# Patient Record
Sex: MALE | Race: WHITE | Employment: OTHER | URBAN - NONMETROPOLITAN AREA
[De-identification: names, ages, dates, MRNs, and addresses within clinical notes are randomized per-mention and may not be internally consistent; named-entity substitution may affect disease eponyms.]

---

## 2021-05-25 ENCOUNTER — OFFICE VISIT (OUTPATIENT)
Dept: CARDIOLOGY CLINIC | Age: 81
End: 2021-05-25
Payer: MEDICARE

## 2021-05-25 VITALS
DIASTOLIC BLOOD PRESSURE: 62 MMHG | WEIGHT: 205 LBS | RESPIRATION RATE: 20 BRPM | HEIGHT: 66 IN | OXYGEN SATURATION: 94 % | HEART RATE: 86 BPM | SYSTOLIC BLOOD PRESSURE: 108 MMHG | BODY MASS INDEX: 32.95 KG/M2

## 2021-05-25 DIAGNOSIS — R06.02 SHORTNESS OF BREATH: ICD-10-CM

## 2021-05-25 DIAGNOSIS — R01.1 CARDIAC MURMUR: ICD-10-CM

## 2021-05-25 DIAGNOSIS — R07.9 CHEST PAIN, UNSPECIFIED TYPE: Primary | ICD-10-CM

## 2021-05-25 PROCEDURE — 93000 ELECTROCARDIOGRAM COMPLETE: CPT | Performed by: INTERNAL MEDICINE

## 2021-05-25 PROCEDURE — 99204 OFFICE O/P NEW MOD 45 MIN: CPT | Performed by: INTERNAL MEDICINE

## 2021-05-25 RX ORDER — LORATADINE 10 MG/1
10 TABLET ORAL DAILY
COMMUNITY

## 2021-05-25 RX ORDER — HYDROCHLOROTHIAZIDE 50 MG/1
TABLET ORAL
COMMUNITY
Start: 2021-05-20 | End: 2021-07-27

## 2021-05-25 RX ORDER — METOPROLOL TARTRATE 50 MG/1
50 TABLET, FILM COATED ORAL SEE ADMIN INSTRUCTIONS
Qty: 2 TABLET | Refills: 0 | Status: SHIPPED | OUTPATIENT
Start: 2021-05-25 | End: 2021-07-27

## 2021-05-25 RX ORDER — ALLOPURINOL 100 MG/1
100 TABLET ORAL DAILY
COMMUNITY

## 2021-05-25 RX ORDER — AMLODIPINE BESYLATE 2.5 MG/1
2.5 TABLET ORAL DAILY
COMMUNITY
End: 2021-07-27

## 2021-05-25 RX ORDER — VITAMIN E 268 MG
400 CAPSULE ORAL DAILY
COMMUNITY

## 2021-05-25 NOTE — PROGRESS NOTES
[address]  5/25/21  Referring: Dr. Luis Allen CONSULT/CHIEF COMPLAINT/HPI     Reason for visit/ Chief complaint  New patient  Shortness  Of breath    HPI Canelo Narayan is a [de-identified] y.o. seen in consult with Dr Luis Barbosa for shortness of breath, and chest pain. He has a history of hypertension, copd, chf, morbid obesity. Recently treated with keflex for cellulitis left knee    He is unsure if he had covid-19, but he has been vaccinated. Smoked from age [age]'s. He has started and quit multiple times, has not smoked for several years. He drinks a couple beers every week or two    He has worsening exertional shortness of breath when he is walking due to shortness of breath, chest pain, palpitations, alleviated with rest. He states that the discomfort is sharp and lasts less than 30 seconds. It is all new over the last month    He has a lot of sinus drainage,and allergies,sometimes a congested cough which he attributes to allergies. He was instructed to use oxygen 4 years ago, but never did. He has no orthopnea,edema  No fever or night sweats. He sleeps in a bed with his head tilted up. He snores,unclear if he quits breathing        Patient is compliant with medications and is tolerating them well without side effects     HISTORY/ALLERGIES/ROS     MedHx:  has a past medical history of CHF (congestive heart failure) ([facility] [address] COPD (chronic obstructive pulmonary disease) ([facility] [address] and Hypertension. SurgHx:  has a past surgical history that includes Hand surgery (Left, 1980) and Appendectomy (1954). SocHx:  reports that he quit smoking about 21 years ago. His smoking use included cigarettes. He started smoking about 61 years ago. He smoked 1.00 pack per day. He has never used smokeless tobacco. He reports previous alcohol use. He reports that he does not use drugs.    FamHx: No family history of premature coronary artery disease, sudden death, or aneurysm  Allergies: Patient has no known allergies. ROS:   Review of Systems   Constitutional: Positive for fatigue. Negative for activity change, diaphoresis and fever. HENT: Negative for congestion and ear discharge. Eyes: Negative for photophobia and visual disturbance. Respiratory: Positive for chest tightness and shortness of breath. Negative for cough. Cardiovascular: Positive for palpitations. Negative for chest pain. Gastrointestinal: Negative for abdominal distention, abdominal pain, blood in stool and nausea. Endocrine: Negative for cold intolerance and polydipsia. Genitourinary: Negative for difficulty urinating and flank pain. Musculoskeletal: Positive for arthralgias and myalgias. Skin: Negative for rash and wound. Allergic/Immunologic: Negative for environmental allergies and immunocompromised state. Neurological: Negative for dizziness and headaches. Hematological: Negative for adenopathy. Does not bruise/bleed easily. Psychiatric/Behavioral: Negative for confusion. The patient is not hyperactive. MEDICATIONS      Prior to Admission medications    Medication Sig Start Date End Date Taking?  Authorizing Provider   amLODIPine (NORVASC) 2.5 MG tablet Take 2.5 mg by mouth daily   Yes Historical Provider, MD   hydroCHLOROthiazide (HYDRODIURIL) 50 MG tablet TAKE ONE TABLET BY MOUTH DAILY 5/20/21  Yes Historical Provider, MD   allopurinol (ZYLOPRIM) 100 MG tablet Take 100 mg by mouth daily   Yes Historical Provider, MD   loratadine (CLARITIN) 10 MG tablet Take 10 mg by mouth daily   Yes Historical Provider, MD   vitamin E 400 UNIT capsule Take 400 Units by mouth daily   Yes Historical Provider, MD   Probiotic Product (PROBIOTIC DAILY PO) Take by mouth   Yes Historical Provider, MD   metoprolol tartrate (LOPRESSOR) 50 MG tablet Take 1 tablet by mouth See Admin Instructions One the night before the cta, one one hour prior test 5/25/21  Yes Laina Lopez, DO       PHYSICAL EXAM        Vitals:    05/25/21 1221   BP: 108/62   Pulse: 86   Resp: 20   SpO2: 94%    Weight: 205 lb (93 kg)     Gen Alert, cooperative, no distress Heart  Regular rate and MGGDCZ,5/4 systolic murmur   Head Normocephalic, atraumatic, no abnormalities Abd  Soft, NT, +BS, no mass, no OM   Eyes PERRLA, conj/corn clear Ext  Ext nl, AT, no C/C, no edema   Nose Nares normal, no drain age, Non-tender Pulse 2+ and symmetric   Throat Lips, mucosa, tongue normal Skin +plethoric   Neck S/S, TM, NT, no bruit Psych Nl mood and affect   Lung  CTA-B, unlabored, no DTP     Ch wall NT, no deform       LABS and Imaging     Relevant and available CV data reviewed  Echo/MRI: 2016  Left ventricle cavity s normal in size, EF 55-60% mild AS  Cath: none  Holter:none  EKG: Sinus  Rhythm   -Right atrial enlargement, RBBB. Personally interpreted  Stress:2002  myoview  moderate Risk  moderate Complexity/Medical Decision Making  Outside records Reviewed  Labs Reviewed  Prior Imaging, ekg,  reviewed when available  Medications reviewed  Old Notes reviewed  12/2020 carotids  -less than 50% bilateral  5/5/2021  Tc 153  Tri 91 hdl 42 ldl 93  ASSESSMENT AND PLAN     1. Atypical Chest pain  - new problem  - differential: hyperviscosity (likely), ischemia, arrhythmia  Plan  - echo, coronary cta    2. Shortness of breath  - new problem- worsening  - 2002 pft moderate JACQUELINE  Plan  - evaluate with coronary cta, and echo    3. Essential Hypertension  - on norvasc 2.5 mg daily, hctz 50 mg daily   - 108/62  - 5/5/ 2021 na 138 pot 3.8 chl 97 bun 22 creat 1.19  0 controlled  Plan  - continue above meds      4. Polycythemia  - 5/14/2021  HGB 21.6/66.5  - being evaluated by Dr Belle Tobin 5/26  - has scheduled phlebotomies  Plan  - continue above    5. Aortic stenosis  -0 new  Problem  Plan:  - echo    Patient counseled on lifestyle modification, diet, and exercise. Follow Up:    One month  Dr. Aguila Chiang:  I, Adamaris Cardenas, am scribing for and in the presence of Brandi Riley MD.   Christie Arita 05/25/21 2:24 PM   Physician Attestation  The scribe for and in the presence of dawood Espinoza DO). The scribe Ignacio Smith may have prepopulated components of this note with my historical  intellectual property under my direct supervision. Any additions to this intellectual property were performed in my presence and at my direction.   Furthermore, the content and accuracy of this note have been reviewed by dawood Espinoza DO).  5/27/2021 11:12 PM

## 2021-05-25 NOTE — PATIENT INSTRUCTIONS
Coronary cta  - take metoprolol 50 mg night before and one hour before testing    Echo    Follow up in one month

## 2021-05-27 ENCOUNTER — PROCEDURE VISIT (OUTPATIENT)
Dept: CARDIOLOGY CLINIC | Age: 81
End: 2021-05-27
Payer: MEDICARE

## 2021-05-27 DIAGNOSIS — R06.02 SHORTNESS OF BREATH: ICD-10-CM

## 2021-05-27 DIAGNOSIS — R07.9 CHEST PAIN, UNSPECIFIED TYPE: ICD-10-CM

## 2021-05-27 DIAGNOSIS — R01.1 CARDIAC MURMUR: ICD-10-CM

## 2021-05-27 LAB
LV EF: 65 %
LVEF MODALITY: NORMAL

## 2021-05-27 ASSESSMENT — ENCOUNTER SYMPTOMS
CHEST TIGHTNESS: 1
ABDOMINAL PAIN: 0
BLOOD IN STOOL: 0
SHORTNESS OF BREATH: 1
NAUSEA: 0
PHOTOPHOBIA: 0
COUGH: 0
ABDOMINAL DISTENTION: 0

## 2021-05-28 PROCEDURE — 93306 TTE W/DOPPLER COMPLETE: CPT | Performed by: INTERNAL MEDICINE

## 2021-06-03 NOTE — RESULT ENCOUNTER NOTE
Patient is  for cardiac CTA on 6/11/21 at 8:30am at [facility] . Patient wants to be called with the day , time and instructions on Monday. He is busy right now and doesn't want to be called .

## 2021-06-11 ENCOUNTER — HOSPITAL ENCOUNTER (OUTPATIENT)
Dept: CT IMAGING | Age: 81
Discharge: HOME | End: 2021-06-11
Payer: MEDICARE

## 2021-06-11 DIAGNOSIS — R07.9 CHEST PAIN, UNSPECIFIED TYPE: ICD-10-CM

## 2021-06-11 DIAGNOSIS — R06.02 SHORTNESS OF BREATH: ICD-10-CM

## 2021-06-14 ENCOUNTER — TELEPHONE (OUTPATIENT)
Dept: CARDIOLOGY CLINIC | Age: 81
End: 2021-06-14

## 2021-06-14 ENCOUNTER — HOSPITAL ENCOUNTER (OUTPATIENT)
Dept: CT IMAGING | Age: 81
Discharge: HOME | End: 2021-06-14
Payer: MEDICARE

## 2021-06-14 VITALS
RESPIRATION RATE: 18 BRPM | TEMPERATURE: 97.7 F | HEART RATE: 70 BPM | SYSTOLIC BLOOD PRESSURE: 150 MMHG | BODY MASS INDEX: 32.95 KG/M2 | OXYGEN SATURATION: 90 % | HEIGHT: 66 IN | DIASTOLIC BLOOD PRESSURE: 77 MMHG | WEIGHT: 205 LBS

## 2021-06-14 PROCEDURE — 6360000004 HC RX CONTRAST MEDICATION: Performed by: INTERNAL MEDICINE

## 2021-06-14 PROCEDURE — 75574 CT ANGIO HRT W/3D IMAGE: CPT

## 2021-06-14 RX ADMIN — IOPAMIDOL 120 ML: 755 INJECTION, SOLUTION INTRAVENOUS at 09:07

## 2021-06-14 ASSESSMENT — PAIN - FUNCTIONAL ASSESSMENT: PAIN_FUNCTIONAL_ASSESSMENT: FACES

## 2021-06-14 NOTE — PROGRESS NOTES
Procedure complete, PIV removed without complication, pt given verbal and written discharge instructions, pt verbalizes understanding.   Pt discharged to home in stable condition

## 2021-06-15 ENCOUNTER — TELEPHONE (OUTPATIENT)
Dept: CARDIOLOGY CLINIC | Age: 81
End: 2021-06-15

## 2021-06-15 NOTE — TELEPHONE ENCOUNTER
Called patient to see if he wanted to reschedule his missed appointment from today. Patient said that he would like Dr. Pedrito Gonzalez to review his Cardiac CTA results before making another appointment. CTA was done on 6/14 at [facility]. Please call patient to discuss results.

## 2021-06-16 DIAGNOSIS — R07.9 CHEST PAIN, UNSPECIFIED TYPE: Primary | ICD-10-CM

## 2021-06-16 RX ORDER — ASPIRIN 81 MG/1
81 TABLET ORAL DAILY
Qty: 90 TABLET | Refills: 3 | Status: SHIPPED | OUTPATIENT
Start: 2021-06-16

## 2021-06-16 RX ORDER — ROSUVASTATIN CALCIUM 20 MG/1
20 TABLET, COATED ORAL DAILY
Qty: 90 TABLET | Refills: 3 | Status: SHIPPED | OUTPATIENT
Start: 2021-06-16

## 2021-06-20 ENCOUNTER — TELEPHONE (OUTPATIENT)
Dept: CARDIOLOGY CLINIC | Age: 81
End: 2021-06-20

## 2021-07-26 ASSESSMENT — ENCOUNTER SYMPTOMS
ABDOMINAL PAIN: 0
SHORTNESS OF BREATH: 1
NAUSEA: 0
COUGH: 0
BLOOD IN STOOL: 0
PHOTOPHOBIA: 0
ABDOMINAL DISTENTION: 0

## 2021-07-26 NOTE — PATIENT INSTRUCTIONS
May use magnesium citrate for constipation  Increase norvasc to 5 mg daily  Monitor to assess palpitations  Referred to Dr Tesfaye Hernandez for a sleep study

## 2021-07-26 NOTE — PROGRESS NOTES
[address]  7/27/21  Referring: Dr. Yovani Hamilton CONSULT/CHIEF COMPLAINT/HPI     Reason for visit/ Chief complaint  Follow up  Shortness  Of breath    HPI Chapis Mckeon is a [de-identified] y.o. seen as a follow up to recent echo and cta to evaluate shortness of breath and chest pain. He has a history of CAD, AS, hypertension, copd, chf, morbid obesity. He is unsure if he had covid-19, but he has been vaccinated. He has polycythemia,  has scheduled phlebotomies ( followed by Dr Sachin Tse)    Smoked from age [age]'s. He has started and quit multiple times, has not smoked for several years. He drinks a couple beers every week or two. He now chews    Today he states he has no chest pain, change in exertional shortness of breath  or dizziness. 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 No associated dizziness. He has no edema, no orthopnea. Complains of constipation. States he has been told he snores. Sometimes naps during the day watching the news. He has wheezing, no cough. Occasionally misses inhaler. pcp stopped hctz and started flomax        Patient is compliant with medications and is tolerating them well without side effects     HISTORY/ALLERGIES/ROS     MedHx:  has a past medical history of CHF (congestive heart failure) ([facility] [address] COPD (chronic obstructive pulmonary disease) ([facility] [address] Hyperlipidemia, and Hypertension. SurgHx:  has a past surgical history that includes Hand surgery (Left, 1980) and Appendectomy (1954). SocHx:  reports that he quit smoking about 21 years ago. His smoking use included cigarettes. He started smoking about 61 years ago. He smoked 1.00 pack per day. His smokeless tobacco use includes chew. He reports previous alcohol use. He reports that he does not use drugs. FamHx: No family history of premature coronary artery disease, sudden death, or aneurysm  Allergies: Patient has no known allergies.    ROS:   Review of Systems Constitutional: Positive for fatigue. Negative for activity change, diaphoresis and fever. HENT: Negative for congestion and ear discharge. Eyes: Negative for photophobia and visual disturbance. Respiratory: Positive for shortness of breath and wheezing. Negative for cough and chest tightness. Cardiovascular: Positive for palpitations. Negative for chest pain. Gastrointestinal: Negative for abdominal distention, abdominal pain, blood in stool and nausea. Endocrine: Negative for cold intolerance and polydipsia. Genitourinary: Negative for difficulty urinating and flank pain. Musculoskeletal: Positive for arthralgias and myalgias. Skin: Negative for rash and wound. Allergic/Immunologic: Negative for environmental allergies and immunocompromised state. Neurological: Negative for dizziness and headaches. Hematological: Negative for adenopathy. Does not bruise/bleed easily. Psychiatric/Behavioral: Negative for confusion. The patient is not hyperactive. MEDICATIONS      Prior to Admission medications    Medication Sig Start Date End Date Taking?  Authorizing Provider   amLODIPine (NORVASC) 5 MG tablet Take 1 tablet by mouth daily 7/27/21  Yes Rogersrishi Evangelista, DO   rosuvastatin (CRESTOR) 20 MG tablet Take 1 tablet by mouth daily 6/16/21  Yes Rogers Evangelista, DO   aspirin EC 81 MG EC tablet Take 1 tablet by mouth daily 6/16/21  Yes Rogersrishi Evangelista, DO   allopurinol (ZYLOPRIM) 100 MG tablet Take 100 mg by mouth daily   Yes Historical Provider, MD   loratadine (CLARITIN) 10 MG tablet Take 10 mg by mouth daily   Yes Historical Provider, MD   vitamin E 400 UNIT capsule Take 400 Units by mouth daily   Yes Historical Provider, MD       PHYSICAL EXAM        Vitals:    07/27/21 1143   BP: 132/74   Pulse: 85   SpO2: 90%    Weight: 210 lb (95.3 kg)     Gen Alert, cooperative, no distress Heart  Regular rate and JAVUUO,9/0 systolic murmur   Head Normocephalic, atraumatic, no abnormalities Abd  Soft, NT, +BS, no mass, no OM   Eyes PERRLA, conj/corn clear Ext  Ext nl, AT, no C/C, no edema   Nose Nares normal, no drain age, Non-tender Pulse 2+ and symmetric   Throat Lips, mucosa, tongue normal Skin +plethoric   Neck S/S, TM, NT, no bruit Psych Nl mood and affect   Lung  CTA-B, unlabored, no DTP     Ch wall NT, no deform       LABS and Imaging     Relevant and available CV data reviewed  Echo/MRI: 2016  Left ventricle cavity s normal in size, EF 55-60% mild AS  5/27/21 Technically difficult examination. Normal left ventricle size and systolic function with an estimated ejection   fraction of 65%. No regional wall motion abnormalities are seen. There is mild concentric left ventricular hypertrophy. E/e\"= 8. Diastolic filling parameters suggests grade I diastolic  dysfunction. The aortic valve area is calculated at 1.0 cm2 with a maximum pressure  gradient of 30 mmHg and a mean pressure gradient of 19 mmHg. This is c/w mild-moderate aortic stenosis. No evidence of aortic valve regurgitation. The right ventricle appears enlarged and mildly decreased in function. Mild tricuspid regurgitation. PASP 34mmHg. The right atrium is mildly dilated. IVC size is normal (<2.1cm) and collapses > 50% with respiration consistent  with normal RA pressure (3mmHg). Cath: none  CTA 6/14/21   Scattered calcified and noncalcified plaque throughout the coronary arteries,   with estimated maximal stenosis in the range of 25% in the proximal LAD.       Mildly calcified aortic valve.  No aortic aneurysm       Holter:none  EKG: Sinus  Rhythm   -Right atrial enlargement, RBBB.  Personally interpreted  Stress:2002  myoview  moderate Risk  moderate Complexity/Medical Decision Making  Outside records Reviewed  Labs Reviewed  Prior Imaging, ekg,  reviewed when available  Medications reviewed  Old Notes reviewed  12/2020 carotids  -less than 50% bilateral  5/5/2021  Tc 153  Tri 91 hdl 42 ldl 93  ASSESSMENT AND PLAN     1.palpitations/pounding  -

## 2021-07-27 ENCOUNTER — OFFICE VISIT (OUTPATIENT)
Dept: CARDIOLOGY CLINIC | Age: 81
End: 2021-07-27
Payer: MEDICARE

## 2021-07-27 VITALS
BODY MASS INDEX: 33.75 KG/M2 | HEART RATE: 85 BPM | HEIGHT: 66 IN | WEIGHT: 210 LBS | OXYGEN SATURATION: 90 % | DIASTOLIC BLOOD PRESSURE: 74 MMHG | SYSTOLIC BLOOD PRESSURE: 132 MMHG

## 2021-07-27 DIAGNOSIS — I10 ESSENTIAL HYPERTENSION: ICD-10-CM

## 2021-07-27 DIAGNOSIS — I25.10 CORONARY ARTERY DISEASE INVOLVING NATIVE CORONARY ARTERY OF NATIVE HEART WITHOUT ANGINA PECTORIS: ICD-10-CM

## 2021-07-27 DIAGNOSIS — R07.9 CHEST PAIN, UNSPECIFIED TYPE: Primary | ICD-10-CM

## 2021-07-27 DIAGNOSIS — R00.2 PALPITATIONS: ICD-10-CM

## 2021-07-27 DIAGNOSIS — E78.2 MIXED HYPERLIPIDEMIA: ICD-10-CM

## 2021-07-27 DIAGNOSIS — D75.1 POLYCYTHEMIA: ICD-10-CM

## 2021-07-27 DIAGNOSIS — R06.02 SHORTNESS OF BREATH: ICD-10-CM

## 2021-07-27 DIAGNOSIS — R06.83 SNORING: ICD-10-CM

## 2021-07-27 DIAGNOSIS — I35.0 AORTIC VALVE STENOSIS, ETIOLOGY OF CARDIAC VALVE DISEASE UNSPECIFIED: ICD-10-CM

## 2021-07-27 PROCEDURE — 99214 OFFICE O/P EST MOD 30 MIN: CPT | Performed by: INTERNAL MEDICINE

## 2021-07-27 PROCEDURE — 93246 EXT ECG>7D<15D RECORDING: CPT | Performed by: INTERNAL MEDICINE

## 2021-07-27 RX ORDER — AMLODIPINE BESYLATE 5 MG/1
5 TABLET ORAL DAILY
Qty: 90 TABLET | Refills: 3 | Status: SHIPPED | OUTPATIENT
Start: 2021-07-27

## 2021-07-28 ASSESSMENT — ENCOUNTER SYMPTOMS
WHEEZING: 1
CHEST TIGHTNESS: 0

## 2021-08-18 PROCEDURE — 93248 EXT ECG>7D<15D REV&INTERPJ: CPT | Performed by: INTERNAL MEDICINE

## 2021-08-19 ENCOUNTER — TELEPHONE (OUTPATIENT)
Dept: CARDIOLOGY CLINIC | Age: 81
End: 2021-08-19

## 2021-11-30 ENCOUNTER — TELEPHONE (OUTPATIENT)
Dept: CARDIOLOGY CLINIC | Age: 81
End: 2021-11-30

## 2021-11-30 NOTE — TELEPHONE ENCOUNTER
Called patient to schedule his 6 month follow up for January 2022. Patient states that Dr. Adamaris Arauz is not on his insurance plan so he will be switching doctors, but he would like to know the results of the heart monitor that he wore. Please advise.

## 2021-11-30 NOTE — TELEPHONE ENCOUNTER
I went over monitor results from July/Aug 2021. He said he has to change cardiology practices because he is required to pay more to stay with us. I told him if anything changes, he is welcome back anytime.

## 2021-12-01 NOTE — TELEPHONE ENCOUNTER
Please let him know that Dr Kathleen Durham will be happy to care for him if he ever has a need  We will be happy to send records when requested